# Patient Record
Sex: FEMALE | Race: WHITE | Employment: PART TIME | URBAN - NONMETROPOLITAN AREA
[De-identification: names, ages, dates, MRNs, and addresses within clinical notes are randomized per-mention and may not be internally consistent; named-entity substitution may affect disease eponyms.]

---

## 2024-06-27 ENCOUNTER — HOSPITAL ENCOUNTER (EMERGENCY)
Age: 24
Discharge: HOME | End: 2024-06-27
Attending: EMERGENCY MEDICINE
Payer: COMMERCIAL

## 2024-06-27 VITALS
BODY MASS INDEX: 39.99 KG/M2 | WEIGHT: 240 LBS | TEMPERATURE: 99.6 F | SYSTOLIC BLOOD PRESSURE: 138 MMHG | OXYGEN SATURATION: 96 % | HEART RATE: 92 BPM | HEIGHT: 65 IN | RESPIRATION RATE: 18 BRPM | DIASTOLIC BLOOD PRESSURE: 88 MMHG

## 2024-06-27 DIAGNOSIS — U07.1 COVID-19: Primary | ICD-10-CM

## 2024-06-27 LAB
FLUAV RNA UPPER RESP QL NAA+PROBE: NEGATIVE
FLUBV AG NPH QL: NEGATIVE
S PYO AG THROAT QL: NEGATIVE
SARS-COV-2 RDRP RESP QL NAA+PROBE: DETECTED

## 2024-06-27 PROCEDURE — 87635 SARS-COV-2 COVID-19 AMP PRB: CPT

## 2024-06-27 PROCEDURE — 87804 INFLUENZA ASSAY W/OPTIC: CPT

## 2024-06-27 PROCEDURE — 99283 EMERGENCY DEPT VISIT LOW MDM: CPT

## 2024-06-27 PROCEDURE — 6370000000 HC RX 637 (ALT 250 FOR IP): Performed by: EMERGENCY MEDICINE

## 2024-06-27 PROCEDURE — 87081 CULTURE SCREEN ONLY: CPT

## 2024-06-27 PROCEDURE — 87880 STREP A ASSAY W/OPTIC: CPT

## 2024-06-27 RX ORDER — IBUPROFEN 600 MG/1
600 TABLET ORAL ONCE
Status: COMPLETED | OUTPATIENT
Start: 2024-06-27 | End: 2024-06-27

## 2024-06-27 RX ORDER — OXYMETAZOLINE HYDROCHLORIDE 0.05 G/100ML
2 SPRAY NASAL ONCE
Status: COMPLETED | OUTPATIENT
Start: 2024-06-27 | End: 2024-06-27

## 2024-06-27 RX ADMIN — IBUPROFEN 600 MG: 600 TABLET, FILM COATED ORAL at 21:31

## 2024-06-27 RX ADMIN — NASAL DECONGESTANT 2 SPRAY: 0.05 SPRAY NASAL at 21:31

## 2024-06-27 ASSESSMENT — PAIN DESCRIPTION - LOCATION: LOCATION: GENERALIZED

## 2024-06-27 ASSESSMENT — PAIN SCALES - GENERAL
PAINLEVEL_OUTOF10: 6
PAINLEVEL_OUTOF10: 6

## 2024-06-27 ASSESSMENT — PAIN - FUNCTIONAL ASSESSMENT: PAIN_FUNCTIONAL_ASSESSMENT: 0-10

## 2024-06-28 NOTE — ED NOTES
Discharge instructions reviewed with Pt. Pt verbalizes understanding at this time. medications reviewed with pt at this time. Pt condition stable at this time. No concerns voiced.

## 2024-06-28 NOTE — DISCHARGE INSTRUCTIONS
You tested for COVID.  Please continue to isolate until your symptoms resolved.  If any other family members come down with similar symptoms they most likely have COVID as well.  Please encourage them to treat with over-the-counter medications.  If you develop severe shortness of breath or difficulties breathing please come back to the ER for repeat evaluation.

## 2024-06-28 NOTE — ED PROVIDER NOTES
[facility] EMERGENCY DEPARTMENT  EMERGENCY DEPARTMENT ENCOUNTER      Pt Name: Jayde Simmons  MRN: [id number]  Birthdate 2000  Date of evaluation: 6/27/2024  Provider: Jay Jay Almonte MD    CHIEF COMPLAINT       Chief Complaint   Patient presents with    Cough     Pt reports cough, congestion, body aches, headache over the past 2 days.          HISTORY OF PRESENT ILLNESS   (Location/Symptom, Timing/Onset, Context/Setting, Quality, Duration, Modifying Factors, Severity)  Note limiting factors.   Jayde Simmons is a 24 y.o. female who presents to the emergency department with the chief complaint of   Chief Complaint   Patient presents with    Cough     Pt reports cough, congestion, body aches, headache over the past 2 days.    . 24-year-old female with no significant past medical history presents the ER for evaluation of nasal congestion myalgias nonproductive cough and headache.  Patient states symptoms been ongoing for approximately 2 days.  Patient has been taking over-the-counter medication such as DayQuil and NyQuil to help treat the symptoms.  Patient states that despite taking his medications her symptoms have persisted.  Patient denies nausea, vomiting, abdominal pain, shortness of breath or chest pain.  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  Patient is concerned that she may have a viral illness.        Nursing Notes were reviewed.    REVIEW OF SYSTEMS    (2-9 systems for level 4, 10 or more for level 5)     Review of Systems   All other systems reviewed and are negative.      Except as noted above the remainder of the review of systems was reviewed and negative.       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

## 2024-06-30 LAB — S PYO THROAT QL CULT: NORMAL

## 2024-07-12 PROCEDURE — 99283 EMERGENCY DEPT VISIT LOW MDM: CPT

## 2024-07-13 ENCOUNTER — APPOINTMENT (OUTPATIENT)
Dept: GENERAL RADIOLOGY | Age: 24
End: 2024-07-13
Attending: EMERGENCY MEDICINE
Payer: COMMERCIAL

## 2024-07-13 ENCOUNTER — HOSPITAL ENCOUNTER (EMERGENCY)
Age: 24
Discharge: HOME | End: 2024-07-13
Attending: EMERGENCY MEDICINE
Payer: COMMERCIAL

## 2024-07-13 VITALS
HEART RATE: 93 BPM | WEIGHT: 260 LBS | RESPIRATION RATE: 16 BRPM | DIASTOLIC BLOOD PRESSURE: 71 MMHG | OXYGEN SATURATION: 99 % | SYSTOLIC BLOOD PRESSURE: 143 MMHG | BODY MASS INDEX: 43.32 KG/M2 | HEIGHT: 65 IN | TEMPERATURE: 98.6 F

## 2024-07-13 DIAGNOSIS — M79.671 ACUTE FOOT PAIN, RIGHT: ICD-10-CM

## 2024-07-13 DIAGNOSIS — R03.0 ELEVATED BLOOD PRESSURE READING: ICD-10-CM

## 2024-07-13 DIAGNOSIS — M25.571 ACUTE RIGHT ANKLE PAIN: Primary | ICD-10-CM

## 2024-07-13 PROCEDURE — 73610 X-RAY EXAM OF ANKLE: CPT

## 2024-07-13 PROCEDURE — 73630 X-RAY EXAM OF FOOT: CPT

## 2024-07-13 RX ORDER — IBUPROFEN 400 MG/1
400 TABLET ORAL ONCE
Status: DISCONTINUED | OUTPATIENT
Start: 2024-07-13 | End: 2024-07-13 | Stop reason: HOSPADM

## 2024-07-13 ASSESSMENT — PAIN DESCRIPTION - ORIENTATION: ORIENTATION: RIGHT

## 2024-07-13 ASSESSMENT — PAIN - FUNCTIONAL ASSESSMENT
PAIN_FUNCTIONAL_ASSESSMENT: 0-10
PAIN_FUNCTIONAL_ASSESSMENT: NONE - DENIES PAIN

## 2024-07-13 ASSESSMENT — PAIN DESCRIPTION - LOCATION: LOCATION: ANKLE

## 2024-07-13 ASSESSMENT — PAIN DESCRIPTION - PAIN TYPE: TYPE: ACUTE PAIN

## 2024-07-13 ASSESSMENT — PAIN SCALES - GENERAL: PAINLEVEL_OUTOF10: 7

## 2024-07-13 ASSESSMENT — PAIN DESCRIPTION - DESCRIPTORS: DESCRIPTORS: ACHING

## 2024-07-13 NOTE — DISCHARGE INSTRUCTIONS
Take Tylenol or ibuprofen as needed for pain.  Use ice for swelling.    Follow-up with your primary doctor or orthopedics over the next 1 to 2 weeks if symptoms or not improving for repeat x-ray or possible MRI.    Otherwise, you can weight-bear as tolerated since this is most likely a sprained ankle.     Return to the emergency department for any worsening pain with new numbness or weakness in the foot, signs of infection, or any other issues related or not related to the fall.

## 2024-07-13 NOTE — ED PROVIDER NOTES
N/A    CONSULTS:  None    EMERGENCY DEPARTMENT COURSE and DIFFERENTIAL DIAGNOSIS/MDM:   Vitals:    Vitals:    07/13/24 0039   BP: (!) 143/71   Pulse: 93   Resp: 16   Temp: 98.6 °F (37 °C)   TempSrc: Oral   SpO2: 99%   Weight: 117.9 kg (260 lb)   Height: 1.651 m (5' 5\")       Patient was given the following medications:  Medications - No data to display    Patient was evaluated due to concern for falling off a porch roughly 1.5 feet up or approximately 2 steps and subsequently twisting her right ankle as she landed.  She has no other complaints at this point including the left leg or no arm complaints but is complaining of right ankle and foot pain.  She did have some tenderness to palpation to the lateral aspect of the foot and ankle on exam.  No pain or tenderness near the knee.  She had slight decreased range of motion of the ankle due to pain.  X-rays ultimately were negative.  No concern for laceration.  No other traumatic findings at this time.  She denied hitting her head or passing out and no need for CT of the head or cervical spine at this time.  She was able to walk with a walking boot once x-rays came back reassuring.  She had normal pulses to both feet.  At this point, I do feel that she is safe for discharge with follow-up with primary doctor or orthopedics over the next week or 2 for any other concerns but at this point she can weight-bear as tolerated due to concern for sprained ankle.  She was well-appearing and in no acute distress at time of discharge and felt comfortable with this plan.  No concern for infection at this time.      I was the primary provider for the patient.      The patient tolerated their visit well.   The patient and / or the family were informed of the results of any tests, a time was given to answer questions.    FINAL IMPRESSION      1. Acute right ankle pain    2. Acute foot pain, right    3. Elevated blood pressure reading          DISPOSITION/PLAN   DISPOSITION Decision  To Discharge 07/13/2024 03:31:15 AM      PATIENT REFERRED TO:  [facility]  [address] [facility] [address]  [phone]  Go to   If symptoms worsen    Shelley Dalton APRN - CNP  [address] [facility] [address]  [phone]    In 1 week  For any other concerns and repeat blood pressure check with primary doctor    Guero Baird MD  [address] [facility] [address]  [phone]    Call   As needed - orthopedics      DISCHARGEMEDICATIONS:  Discharge Medication List as of 7/13/2024  3:42 AM          DISCONTINUED MEDICATIONS:  Discharge Medication List as of 7/13/2024  3:42 AM        STOP taking these medications       Prenatal MV-Min-Fe Fum-FA-DHA (PRENATAL 1 PO) Comments:   Reason for Stopping:         ibuprofen (ADVIL;MOTRIN) 800 MG tablet Comments:   Reason for Stopping:                      (Please note that portions of this note were completed with a voicerecognition program.  Efforts were made to edit the dictations but occasionally words are mis-transcribed.)    Jason Hunter MD (electronically signed)            Jason Hunter MD  07/14/24 5669